# Patient Record
Sex: MALE | Race: WHITE
[De-identification: names, ages, dates, MRNs, and addresses within clinical notes are randomized per-mention and may not be internally consistent; named-entity substitution may affect disease eponyms.]

---

## 2017-07-26 PROBLEM — Z00.00 ENCOUNTER FOR PREVENTIVE HEALTH EXAMINATION: Status: ACTIVE | Noted: 2017-07-26

## 2017-11-27 ENCOUNTER — APPOINTMENT (OUTPATIENT)
Dept: OTOLARYNGOLOGY | Facility: CLINIC | Age: 77
End: 2017-11-27
Payer: MEDICARE

## 2017-11-27 DIAGNOSIS — Z78.9 OTHER SPECIFIED HEALTH STATUS: ICD-10-CM

## 2017-11-27 DIAGNOSIS — J34.89 OTHER SPECIFIED DISORDERS OF NOSE AND NASAL SINUSES: ICD-10-CM

## 2017-11-27 DIAGNOSIS — E78.5 HYPERLIPIDEMIA, UNSPECIFIED: ICD-10-CM

## 2017-11-27 DIAGNOSIS — I10 ESSENTIAL (PRIMARY) HYPERTENSION: ICD-10-CM

## 2017-11-27 DIAGNOSIS — K13.79 OTHER LESIONS OF ORAL MUCOSA: ICD-10-CM

## 2017-11-27 DIAGNOSIS — E11.9 TYPE 2 DIABETES MELLITUS W/OUT COMPLICATIONS: ICD-10-CM

## 2017-11-27 PROCEDURE — 31231 NASAL ENDOSCOPY DX: CPT

## 2017-11-27 PROCEDURE — 99204 OFFICE O/P NEW MOD 45 MIN: CPT | Mod: 25

## 2017-11-27 PROCEDURE — 92550 TYMPANOMETRY & REFLEX THRESH: CPT

## 2017-11-27 PROCEDURE — 99202 OFFICE O/P NEW SF 15 MIN: CPT | Mod: 25

## 2017-11-27 PROCEDURE — 92557 COMPREHENSIVE HEARING TEST: CPT

## 2017-11-27 RX ORDER — ESCITALOPRAM OXALATE 10 MG/1
10 TABLET ORAL
Refills: 0 | Status: ACTIVE | COMMUNITY

## 2017-11-27 RX ORDER — AMLODIPINE BESYLATE 5 MG/1
5 TABLET ORAL
Refills: 0 | Status: ACTIVE | COMMUNITY

## 2017-11-27 RX ORDER — METFORMIN HYDROCHLORIDE 500 MG/1
500 TABLET, FILM COATED, EXTENDED RELEASE ORAL
Refills: 0 | Status: ACTIVE | COMMUNITY

## 2017-11-27 RX ORDER — QUINAPRIL HYDROCHLORIDE 40 MG/1
40 TABLET, FILM COATED ORAL
Refills: 0 | Status: ACTIVE | COMMUNITY

## 2017-11-27 RX ORDER — ATORVASTATIN CALCIUM 40 MG/1
40 TABLET, FILM COATED ORAL
Refills: 0 | Status: ACTIVE | COMMUNITY

## 2017-11-27 RX ORDER — OMEPRAZOLE 20 MG/1
20 CAPSULE, DELAYED RELEASE ORAL
Refills: 0 | Status: ACTIVE | COMMUNITY

## 2017-11-27 RX ORDER — TAMSULOSIN HYDROCHLORIDE 0.4 MG/1
0.4 CAPSULE ORAL
Refills: 0 | Status: ACTIVE | COMMUNITY

## 2017-11-27 RX ORDER — FLUTICASONE PROPIONATE 50 UG/1
50 SPRAY, METERED NASAL DAILY
Qty: 2 | Refills: 3 | Status: ACTIVE | COMMUNITY
Start: 2017-11-27 | End: 1900-01-01

## 2017-11-27 RX ORDER — FINASTERIDE 5 MG/1
5 TABLET, FILM COATED ORAL
Refills: 0 | Status: ACTIVE | COMMUNITY

## 2018-01-17 ENCOUNTER — APPOINTMENT (OUTPATIENT)
Dept: OTOLARYNGOLOGY | Facility: CLINIC | Age: 78
End: 2018-01-17
Payer: MEDICARE

## 2018-01-17 DIAGNOSIS — J34.2 DEVIATED NASAL SEPTUM: ICD-10-CM

## 2018-01-17 PROCEDURE — 99213 OFFICE O/P EST LOW 20 MIN: CPT | Mod: 25

## 2018-01-17 PROCEDURE — 31231 NASAL ENDOSCOPY DX: CPT | Mod: 52

## 2018-03-13 ENCOUNTER — OTHER (OUTPATIENT)
Age: 78
End: 2018-03-13

## 2018-03-13 RX ORDER — DIAZEPAM 10 MG/1
10 TABLET ORAL
Qty: 2 | Refills: 0 | Status: ACTIVE | COMMUNITY
Start: 2018-03-13 | End: 1900-01-01

## 2018-03-22 ENCOUNTER — APPOINTMENT (OUTPATIENT)
Dept: OTOLARYNGOLOGY | Facility: CLINIC | Age: 78
End: 2018-03-22

## 2018-07-18 ENCOUNTER — OUTPATIENT (OUTPATIENT)
Dept: OUTPATIENT SERVICES | Facility: HOSPITAL | Age: 78
LOS: 1 days | Discharge: HOME | End: 2018-07-18

## 2018-07-18 DIAGNOSIS — R53.83 OTHER FATIGUE: ICD-10-CM

## 2018-07-18 DIAGNOSIS — N40.0 BENIGN PROSTATIC HYPERPLASIA WITHOUT LOWER URINARY TRACT SYMPTOMS: ICD-10-CM

## 2018-07-18 DIAGNOSIS — Z00.8 ENCOUNTER FOR OTHER GENERAL EXAMINATION: ICD-10-CM

## 2018-07-18 DIAGNOSIS — E78.5 HYPERLIPIDEMIA, UNSPECIFIED: ICD-10-CM

## 2018-07-18 DIAGNOSIS — I10 ESSENTIAL (PRIMARY) HYPERTENSION: ICD-10-CM

## 2018-07-18 DIAGNOSIS — E55.9 VITAMIN D DEFICIENCY, UNSPECIFIED: ICD-10-CM

## 2018-07-18 DIAGNOSIS — R42 DIZZINESS AND GIDDINESS: ICD-10-CM

## 2019-01-24 ENCOUNTER — OTHER (OUTPATIENT)
Age: 79
End: 2019-01-24

## 2019-01-24 RX ORDER — DIAZEPAM 10 MG/1
10 TABLET ORAL
Qty: 2 | Refills: 0 | Status: ACTIVE | COMMUNITY
Start: 2019-01-24 | End: 1900-01-01

## 2019-01-31 ENCOUNTER — APPOINTMENT (OUTPATIENT)
Dept: OTOLARYNGOLOGY | Facility: CLINIC | Age: 79
End: 2019-01-31
Payer: MEDICARE

## 2019-01-31 DIAGNOSIS — J34.3 HYPERTROPHY OF NASAL TURBINATES: ICD-10-CM

## 2019-01-31 PROCEDURE — 30930 THER FX NASAL INF TURBINATE: CPT

## 2019-01-31 PROCEDURE — 30802 ABLATE INF TURBINATE SUBMUC: CPT | Mod: 59

## 2019-05-06 ENCOUNTER — OUTPATIENT (OUTPATIENT)
Dept: OUTPATIENT SERVICES | Facility: HOSPITAL | Age: 79
LOS: 1 days | Discharge: HOME | End: 2019-05-06
Payer: MEDICARE

## 2019-05-06 DIAGNOSIS — K80.80 OTHER CHOLELITHIASIS WITHOUT OBSTRUCTION: ICD-10-CM

## 2019-05-06 PROCEDURE — 76705 ECHO EXAM OF ABDOMEN: CPT | Mod: 26

## 2019-05-06 PROCEDURE — 76775 US EXAM ABDO BACK WALL LIM: CPT | Mod: 26

## 2021-11-05 ENCOUNTER — OUTPATIENT (OUTPATIENT)
Dept: OUTPATIENT SERVICES | Facility: HOSPITAL | Age: 81
LOS: 1 days | Discharge: HOME | End: 2021-11-05
Payer: COMMERCIAL

## 2021-11-05 DIAGNOSIS — M54.50 LOW BACK PAIN, UNSPECIFIED: ICD-10-CM

## 2021-11-05 PROCEDURE — 72110 X-RAY EXAM L-2 SPINE 4/>VWS: CPT | Mod: 26

## 2021-12-13 ENCOUNTER — OUTPATIENT (OUTPATIENT)
Dept: OUTPATIENT SERVICES | Facility: HOSPITAL | Age: 81
LOS: 1 days | Discharge: HOME | End: 2021-12-13
Payer: COMMERCIAL

## 2021-12-13 DIAGNOSIS — M79.646 PAIN IN UNSPECIFIED FINGER(S): ICD-10-CM

## 2021-12-13 PROCEDURE — 73130 X-RAY EXAM OF HAND: CPT | Mod: 26,RT

## 2022-05-10 ENCOUNTER — OUTPATIENT (OUTPATIENT)
Dept: OUTPATIENT SERVICES | Facility: HOSPITAL | Age: 82
LOS: 1 days | Discharge: HOME | End: 2022-05-10
Payer: COMMERCIAL

## 2022-05-10 DIAGNOSIS — R10.2 PELVIC AND PERINEAL PAIN: ICD-10-CM

## 2022-05-10 PROCEDURE — 76856 US EXAM PELVIC COMPLETE: CPT | Mod: 26

## 2022-12-29 ENCOUNTER — INPATIENT (INPATIENT)
Facility: HOSPITAL | Age: 82
LOS: 0 days | Discharge: HOME | End: 2022-12-30
Attending: INTERNAL MEDICINE | Admitting: INTERNAL MEDICINE
Payer: MEDICARE

## 2022-12-29 VITALS
HEART RATE: 69 BPM | SYSTOLIC BLOOD PRESSURE: 132 MMHG | TEMPERATURE: 96 F | OXYGEN SATURATION: 98 % | DIASTOLIC BLOOD PRESSURE: 71 MMHG | RESPIRATION RATE: 20 BRPM

## 2022-12-29 LAB
ALBUMIN SERPL ELPH-MCNC: 4 G/DL — SIGNIFICANT CHANGE UP (ref 3.5–5.2)
ALP SERPL-CCNC: 63 U/L — SIGNIFICANT CHANGE UP (ref 30–115)
ALT FLD-CCNC: 15 U/L — SIGNIFICANT CHANGE UP (ref 0–41)
ANION GAP SERPL CALC-SCNC: 14 MMOL/L — SIGNIFICANT CHANGE UP (ref 7–14)
AST SERPL-CCNC: 30 U/L — SIGNIFICANT CHANGE UP (ref 0–41)
BASOPHILS # BLD AUTO: 0.05 K/UL — SIGNIFICANT CHANGE UP (ref 0–0.2)
BASOPHILS NFR BLD AUTO: 0.4 % — SIGNIFICANT CHANGE UP (ref 0–1)
BILIRUB SERPL-MCNC: 0.5 MG/DL — SIGNIFICANT CHANGE UP (ref 0.2–1.2)
BUN SERPL-MCNC: 30 MG/DL — HIGH (ref 10–20)
CALCIUM SERPL-MCNC: 8.9 MG/DL — SIGNIFICANT CHANGE UP (ref 8.4–10.5)
CHLORIDE SERPL-SCNC: 97 MMOL/L — LOW (ref 98–110)
CO2 SERPL-SCNC: 28 MMOL/L — SIGNIFICANT CHANGE UP (ref 17–32)
CREAT SERPL-MCNC: 1.2 MG/DL — SIGNIFICANT CHANGE UP (ref 0.7–1.5)
EGFR: 60 ML/MIN/1.73M2 — SIGNIFICANT CHANGE UP
EOSINOPHIL # BLD AUTO: 0.09 K/UL — SIGNIFICANT CHANGE UP (ref 0–0.7)
EOSINOPHIL NFR BLD AUTO: 0.7 % — SIGNIFICANT CHANGE UP (ref 0–8)
GLUCOSE SERPL-MCNC: 73 MG/DL — SIGNIFICANT CHANGE UP (ref 70–99)
HCT VFR BLD CALC: 38.4 % — LOW (ref 42–52)
HGB BLD-MCNC: 12.7 G/DL — LOW (ref 14–18)
IMM GRANULOCYTES NFR BLD AUTO: 0.6 % — HIGH (ref 0.1–0.3)
LYMPHOCYTES # BLD AUTO: 1.61 K/UL — SIGNIFICANT CHANGE UP (ref 1.2–3.4)
LYMPHOCYTES # BLD AUTO: 13.1 % — LOW (ref 20.5–51.1)
MAGNESIUM SERPL-MCNC: 1.7 MG/DL — LOW (ref 1.8–2.4)
MCHC RBC-ENTMCNC: 28.2 PG — SIGNIFICANT CHANGE UP (ref 27–31)
MCHC RBC-ENTMCNC: 33.1 G/DL — SIGNIFICANT CHANGE UP (ref 32–37)
MCV RBC AUTO: 85.3 FL — SIGNIFICANT CHANGE UP (ref 80–94)
MONOCYTES # BLD AUTO: 0.73 K/UL — HIGH (ref 0.1–0.6)
MONOCYTES NFR BLD AUTO: 5.9 % — SIGNIFICANT CHANGE UP (ref 1.7–9.3)
NEUTROPHILS # BLD AUTO: 9.78 K/UL — HIGH (ref 1.4–6.5)
NEUTROPHILS NFR BLD AUTO: 79.3 % — HIGH (ref 42.2–75.2)
NRBC # BLD: 0 /100 WBCS — SIGNIFICANT CHANGE UP (ref 0–0)
PLATELET # BLD AUTO: 302 K/UL — SIGNIFICANT CHANGE UP (ref 130–400)
POTASSIUM SERPL-MCNC: 5.2 MMOL/L — HIGH (ref 3.5–5)
POTASSIUM SERPL-SCNC: 5.2 MMOL/L — HIGH (ref 3.5–5)
PROT SERPL-MCNC: 6.5 G/DL — SIGNIFICANT CHANGE UP (ref 6–8)
RBC # BLD: 4.5 M/UL — LOW (ref 4.7–6.1)
RBC # FLD: 12.8 % — SIGNIFICANT CHANGE UP (ref 11.5–14.5)
SARS-COV-2 RNA SPEC QL NAA+PROBE: SIGNIFICANT CHANGE UP
SODIUM SERPL-SCNC: 139 MMOL/L — SIGNIFICANT CHANGE UP (ref 135–146)
TROPONIN T SERPL-MCNC: <0.01 NG/ML — SIGNIFICANT CHANGE UP
TROPONIN T SERPL-MCNC: <0.01 NG/ML — SIGNIFICANT CHANGE UP
WBC # BLD: 12.33 K/UL — HIGH (ref 4.8–10.8)
WBC # FLD AUTO: 12.33 K/UL — HIGH (ref 4.8–10.8)

## 2022-12-29 PROCEDURE — 93010 ELECTROCARDIOGRAM REPORT: CPT

## 2022-12-29 PROCEDURE — 99221 1ST HOSP IP/OBS SF/LOW 40: CPT | Mod: GC

## 2022-12-29 PROCEDURE — 99222 1ST HOSP IP/OBS MODERATE 55: CPT

## 2022-12-29 PROCEDURE — 93010 ELECTROCARDIOGRAM REPORT: CPT | Mod: 77

## 2022-12-29 PROCEDURE — 71045 X-RAY EXAM CHEST 1 VIEW: CPT | Mod: 26

## 2022-12-29 PROCEDURE — 99236 HOSP IP/OBS SAME DATE HI 85: CPT

## 2022-12-29 RX ORDER — MAGNESIUM OXIDE 400 MG ORAL TABLET 241.3 MG
400 TABLET ORAL ONCE
Refills: 0 | Status: COMPLETED | OUTPATIENT
Start: 2022-12-29 | End: 2022-12-29

## 2022-12-29 RX ORDER — SODIUM CHLORIDE 9 MG/ML
1000 INJECTION INTRAMUSCULAR; INTRAVENOUS; SUBCUTANEOUS ONCE
Refills: 0 | Status: COMPLETED | OUTPATIENT
Start: 2022-12-29 | End: 2022-12-29

## 2022-12-29 RX ADMIN — MAGNESIUM OXIDE 400 MG ORAL TABLET 400 MILLIGRAM(S): 241.3 TABLET ORAL at 21:46

## 2022-12-29 RX ADMIN — SODIUM CHLORIDE 1000 MILLILITER(S): 9 INJECTION INTRAMUSCULAR; INTRAVENOUS; SUBCUTANEOUS at 16:59

## 2022-12-29 NOTE — ED ADULT TRIAGE NOTE - GLASGOW COMA SCALE: SCORE, MLM
Patient has done well thoughout the day. Spouse at bedside through the day.
Patient denies additional blood with stools. Has been up ambulating throughout
the day. Denies further needs at this time. Will report off to night shift. 15

## 2022-12-29 NOTE — H&P ADULT - NSHPPHYSICALEXAM_GEN_ALL_CORE
VITALS  T(C): 36.9 (12-29-22 @ 19:30), Max: 36.9 (12-29-22 @ 19:30)  HR: 87 (12-29-22 @ 19:30) (69 - 87)  BP: 144/75 (12-29-22 @ 19:30) (132/71 - 144/75)  RR: 18 (12-29-22 @ 19:30) (18 - 20)  SpO2: 98% (12-29-22 @ 19:30) (98% - 98%)    PHYSICAL EXAM  GENERAL: NAD, well-developed  NEURO: AO x3, PERRLA, EOMI, motor strength intact in 4/4 extremities, sensation intact  HEAD:  Atraumatic, Normocephalic  EYES: conjunctiva and sclera clear  NECK: Supple, No JVD, no lymphadenopathy, no thyromegaly  CHEST/LUNG: Clear to auscultation bilaterally; No wheezes, rales or rhonchi  HEART: Regular rate and rhythm; No murmurs, rubs, or gallops  ABDOMEN: Soft, Nontender, Nondistended; Bowel sounds present, no masses.  EXTREMITIES:  2+ Peripheral Pulses, No clubbing, cyanosis, or edema  SKIN: Warm, dry, intact, no rashes or lesions  PSYCH: affect appropriate

## 2022-12-29 NOTE — H&P ADULT - NSICDXPASTMEDICALHX_GEN_ALL_CORE_FT
PAST MEDICAL HISTORY:  BPH (benign prostatic hyperplasia)     DM (diabetes mellitus)     Dyslipidemia     Hypertension

## 2022-12-29 NOTE — ED CDU PROVIDER INITIAL DAY NOTE - PROGRESS NOTE DETAILS
2nd Ekg with dynamic changes compared to initial; stemi code called - pt seen by cardio fellow; no emergent invervention required; admit to tele; pt currenlty assymptomatic;

## 2022-12-29 NOTE — ED PROVIDER NOTE - COVID-19  TEST TYPE
DR. Hernandez called and updated, pt agitated, sitting up in bed and vomited.  Pt given lorazepam which helped the agitation and pt calmed down.  Propofol and precedex infusions continue.  Pt's sister at bedside. Will stop tube feeding for the night and reassess in am. Pt also have increased secretions from ETT since 1400, thick yellow color.  Will obtain sputum sample per orders. Report given to Brian HAYWOOD at bedside and Dr. Hernandez in to see pt.  Pt following some simple commands. Will continue to monitor.    MOLECULAR PCR

## 2022-12-29 NOTE — CHART NOTE - NSCHARTNOTEFT_GEN_A_CORE
Pt was seen and examined at bedside.     82 year old M with hx of htn, hld presenting to er with episode of near syncope. Pt was driving in car earlier today when had episode of lightheadedness/near syncope x few minutes. Pt was able to pull car over and call ems. Patient denies any chest pain.     Trop negx2    EKG: NSR with no ischemic ST changes.     Case was discussed with attending.     Code STEMI was cancelled.

## 2022-12-29 NOTE — ED PROVIDER NOTE - NS ED ROS FT
Constitutional: no fever, chills, no recent weight loss, change in appetite or malaise  Eyes: no redness/discharge/pain/vision changes  ENT: no rhinorrhea/ear pain/sore throat  Cardiac: No chest pain, SOB or edema.  Respiratory: No cough or respiratory distress  GI: No nausea, vomiting, diarrhea or abdominal pain.  : No dysuria, frequency, urgency or hematuria  MS: no pain to back or extremities, no loss of ROM, no weakness  Neuro: + lightheadedness, near syncope. No headache or weakness. No LOC.  Skin: No skin rash.  Endocrine: No history of thyroid disease or diabetes.  Except as documented in the HPI, all other systems are negative.

## 2022-12-29 NOTE — ED PROVIDER NOTE - OBJECTIVE STATEMENT
82 year old M with hx of htn, hld presenting to er with episode of near syncope. Pt sts was driving in car earlier today when had episode of lightheadedness/near syncope x few minutes. Pt was able to pull car over and call ems. Pt sts had similar episode at home yesterday lasting few min. Currently denies any symptoms in ed. Sts has been eating/drinking well at home. + recent uri symptoms and currently on abx (does not remember name). Otherwise denies any assoc headache, dizziness, loc, chest pain, sob, abd pain, nausea, vomiting, diarrhea, visual changes, urinary symptoms, speech changes facial droop ,inability to bare weight or ambulate.

## 2022-12-29 NOTE — ED PROVIDER NOTE - PHYSICAL EXAMINATION
CONSTITUTIONAL: Well-appearing; well-nourished; in no apparent distress.   EYES: PERRL; EOM intact.   NECK: Supple; non-tender; no cervical lymphadenopathy.   CARDIOVASCULAR: Normal S1, S2; no murmurs, rubs, or gallops. Equal radial pulses  RESPIRATORY: Normal chest excursion with respiration; breath sounds clear and equal bilaterally; no wheezes, rhonchi, or rales.  GI/: Normal bowel sounds; non-distended; non-tender; no palpable organomegaly.   MS: No evidence of trauma or deformity. Normal ROM in all four extremities; non-tender to palpation; distal pulses are normal.   SKIN: Normal for age and race; warm; dry; good turgor; no apparent lesions or exudate.   Extrem: no peripheral edema. No calf ttp  NEURO/PSYCH: A & O x 4; grossly unremarkable. mood and manner are appropriate. no focal deficits. no facial droop. No tongue deviation. Cerebellar intact. normal gait. Sensation intact

## 2022-12-29 NOTE — CONSULT NOTE ADULT - ASSESSMENT
82 year old M with hx of htn, hld presenting to er with episode of near syncope. Pt was driving in car earlier today when had episode of lightheadedness/near syncope x few minutes not associated with palpitation or chest pain. Pt was able to pull car over and call EMS. Patient denies similar prior events. Pt follow with outpt cardiology at [facility] for HTN.      # Near Syncope   # HTN / HLD  - asymptomatic at encounter  - no events on tele  - trop neg x2  - ekg: NSR  - check echo   - restart home meds   - c/w tele monitoring --> if no events then outpt cardiology f/u for event monitor    82 year old M with hx of htn, hld presenting to er with episode of near syncope. Pt was driving in car earlier today when had episode of lightheadedness/near syncope x few minutes not associated with palpitation or chest pain. Pt was able to pull car over and call EMS. Patient denies similar prior events. Pt follow with outpt cardiology at [facility] for HTN.      # Near Syncope   # HTN / HLD  - asymptomatic at encounter  - no events on tele  - trop neg x2  - ekg: NSR  - check echo  - check orthostatics    - restart home meds   - c/w tele monitoring --> if no events then outpt cardiology f/u for event monitor    82 year old M with hx of htn, hld presenting to er with episode of near syncope. Pt was driving in car earlier today when had episode of lightheadedness/near syncope x few minutes not associated with palpitation or chest pain. Pt was able to pull car over and call EMS. Patient denies similar prior events. Pt follow with outpt cardiology at [facility] for HTN.      # Near Syncope   # HTN / HLD    - asymptomatic at encounter  - no events on tele  - trop neg x2  - ekg: NSR    - check echo  - check orthostatics    - restart home meds   - c/w tele monitoring --> if no events then outpt cardiology f/u for event monitor

## 2022-12-29 NOTE — ED CDU PROVIDER DISPOSITION NOTE - CLINICAL COURSE
82-year-old male history of hypertension dyslipidemia prediabetes former smoker presenting for 2 episodes of near syncope.  Both episodes while at rest, felt dizzy/lightheaded and presyncopal.  No fall/trauma.  No other acute complaints.  No chest pain, shortness of breath, abdominal pain, nausea vomiting diarrhea or urinary symptoms.  Currently feeling well.  Well appearing, NAD, non toxic. NCAT PERRLA EOMI neck supple non tender normal wob cta bl rrr abdomen s nt nd no rebound no guarding WWPx4 neuro non focal.  Labs EKG imaging reviewed.  2nd EKG with ischemic changes. sp cardiology eval. will admit for further eval

## 2022-12-29 NOTE — H&P ADULT - HISTORY OF PRESENT ILLNESS
82M w/ h/o HTN, DLD, DM, and BPH p/w lightheadedness a/w near-syncope. Episode occurred earlier today while driving and last a few minutes. Subsequently pulled over and called EMS. Reports similar episode yesterday that also lasted a few minutes, but otherwise no similar prior h/o. Recently diagnosed w/ URI and started on cefdinir. No decreased PO intake reported. No reported fevers, chills, CP, palpitations, SOB, abdominal pain, n/v/d, dysuria, or LE swelling.     In ED, pt HD stable on vitals. No acute ischemic changes on initial EKG. Tn negative x2. Initially admitted to OBS unit. However, repeat EKG demonstrated findings for acute ischemic changes, so Code STEMI activated. Per cardiology, no acute intervention offered at this time. Admitted to telemetry for ACS r/o.

## 2022-12-29 NOTE — ED ADULT NURSE NOTE - NSIMPLEMENTINTERV_GEN_ALL_ED
Implemented All Universal Safety Interventions:  [place] to call system. Call bell, personal items and telephone within reach. Instruct patient to call for assistance. Room bathroom lighting operational. Non-slip footwear when patient is off stretcher. Physically safe environment: no spills, clutter or unnecessary equipment. Stretcher in lowest position, wheels locked, appropriate side rails in place.

## 2022-12-29 NOTE — ED PROVIDER NOTE - CLINICAL SUMMARY MEDICAL DECISION MAKING FREE TEXT BOX
82-year-old male history of hypertension dyslipidemia prediabetes former smoker presenting for 2 episodes of near syncope.  Both episodes while at rest, felt dizzy/lightheaded and presyncopal.  No fall/trauma.  No other acute complaints.  No chest pain, shortness of breath, abdominal pain, nausea vomiting diarrhea or urinary symptoms.  Currently feeling well.  Well appearing, NAD, non toxic. NCAT PERRLA EOMI neck supple non tender normal wob cta bl rrr abdomen s nt nd no rebound no guarding WWPx4 neuro non focal.  Labs EKG imaging reviewed.  Will OBS for further evaluation.

## 2022-12-29 NOTE — CONSULT NOTE ADULT - SUBJECTIVE AND OBJECTIVE BOX
Outpt cardiologist: [facility]     HPI:  82 year old M with hx of htn, hld presenting to er with episode of near syncope. Pt was driving in car earlier today when had episode of lightheadedness/near syncope x few minutes not associated with palpiation or chest pain. Pt was able to pull car over and call EMS. Patient denies similar prior events. Pt follow with outpt cardiology at [facility] for HTN.        PAST MEDICAL & SURGICAL HISTORY      FAMILY HISTORY:  FAMILY HISTORY:  No pertinent family history in first degree relatives        SOCIAL HISTORY:  Social History: former smoker       ALLERGIES:  No Known Allergies      MEDICATIONS:    PRN:      HOME MEDICATIONS:  Home Medications:      VITALS:   T(F): 98.4 (- @ 19:30), Max: 98.4 ( @ 19:30)  HR: 87 ( @ 19:30) (69 - 87)  BP: 144/75 ( @ 19:30) (132/71 - 144/75)  BP(mean): --  RR: 18 ( @ 19:30) (18 - 20)  SpO2: 98% ( @ 19:30) (98% - 98%)    I&O's Summary      REVIEW OF SYSTEMS:  CONSTITUTIONAL: No weakness, fevers or chills (+) lightheadedness   HEENT: No visual changes, neck/ear pain  RESPIRATORY: No cough, sob  CARDIOVASCULAR: See HPI  GASTROINTESTINAL: No abdominal pain. No nausea, vomiting, diarrhea   GENITOURINARY: No dysuria, frequency or hematuria  NEUROLOGICAL: No new focal deficits  SKIN: No new rashes    PHYSICAL EXAM:  General: Not in distress.  Non-toxic appearing.   HEENT: EOMI  Cardio: regular, S1, S2, no murmur  Pulm: B/L BS.  No wheezing / crackles / rales  Abdomen: Soft, non-tender, non-distended. Normoactive bowel sounds  Extremities: No edema b/l le  Neuro: A&O x3. No focal deficits    LABS:                        12.7   12.33 )-----------( 302      ( 29 Dec 2022 17:20 )             38.4         139  |  97<L>  |  30<H>  ----------------------------<  73  5.2<H>   |  28  |  1.2    Ca    8.9      29 Dec 2022 17:20  Mg     1.7         TPro  6.5  /  Alb  4.0  /  TBili  0.5  /  DBili  x   /  AST  30  /  ALT  15  /  AlkPhos  63        Troponin T, Serum: <0.01 ng/mL (22 @ 21:36)  Troponin T, Serum: <0.01 ng/mL (22 @ 17:20)    CARDIAC MARKERS ( 29 Dec 2022 21:36 )  x     / <0.01 ng/mL / x     / x     / x      CARDIAC MARKERS ( 29 Dec 2022 17:20 )  x     / <0.01 ng/mL / x     / x     / x            Troponin trend: trop neg x2        COVID-19 PCR: NotDetec (29 Dec 2022 15:45)      RADIOLOGY:  CXR: no acute cardiopul disease       EC Lead ECG:   Ventricular Rate 70 BPM    Atrial Rate 70 BPM    P-R Interval 132 ms    QRS Duration 116 ms    Q-T Interval 416 ms    QTC Calculation(Bazett) 449 ms    P Axis 42 degrees    R Axis 46 degrees    T Axis 48 degrees    Diagnosis Line Normal sinus rhythm  Normal ECG    Confirmed by Eduard Elias ([id number]) on 2022 2:53:47 PM ( @ 14:13)      TELEMETRY EVENTS:   Outpt cardiologist:  [facility]    HPI:  82 year old M with hx of htn, hld presenting to er with episode of near syncope. Pt was driving in car earlier today when had episode of lightheadedness/near syncope x few minutes not associated with palpitations or chest pain. Pt was able to pull car over and call EMS. Patient denies similar prior events. Pt follow with outpt cardiology at [facility] for HTN.          No pertinent family history of premature CAD in first degree relatives    Social History: former smoker       ALLERGIES:  No Known Allergies      MEDICATIONS:      VITALS:   T(F): 98.4 (12-29 @ 19:30), Max: 98.4 (12-29 @ 19:30)  HR: 87 (12-29 @ 19:30) (69 - 87)  BP: 144/75 (12-29 @ 19:30) (132/71 - 144/75)  BP(mean): --  RR: 18 (12-29 @ 19:30) (18 - 20)  SpO2: 98% (12-29 @ 19:30) (98% - 98%)      REVIEW OF SYSTEMS:  CONSTITUTIONAL: No weakness, fevers or chills (+) lightheadedness   HEENT: No visual changes, neck/ear pain  RESPIRATORY: No cough, sob  CARDIOVASCULAR: See HPI  GASTROINTESTINAL: No abdominal pain. No nausea, vomiting, diarrhea   GENITOURINARY: No dysuria, frequency or hematuria  NEUROLOGICAL: No new focal deficits  SKIN: No new rashes    PHYSICAL EXAM:  General: Not in distress.  Non-toxic appearing.   HEENT: EOMI  Cardio: regular, S1, S2, no murmur  Pulm: B/L BS.  No wheezing / crackles / rales  Abdomen: Soft, non-tender, non-distended  Extremities: No edema b/l le  Neuro: A&O x3. No focal deficits    LABS:                        12.7   12.33 )-----------( 302      ( 29 Dec 2022 17:20 )             38.4     12-29    139  |  97<L>  |  30<H>  ----------------------------<  73  5.2<H>   |  28  |  1.2    Ca    8.9      29 Dec 2022 17:20  Mg     1.7     12-29    TPro  6.5  /  Alb  4.0  /  TBili  0.5  /  DBili  x   /  AST  30  /  ALT  15  /  AlkPhos  63  12-29      Troponin T, Serum: <0.01 ng/mL (12-29-22 @ 21:36)  Troponin T, Serum: <0.01 ng/mL (12-29-22 @ 17:20)      COVID-19 PCR: NotDete (29 Dec 2022 15:45)      RADIOLOGY:  CXR: no acute cardiopul disease

## 2022-12-29 NOTE — CONSULT NOTE ADULT - ATTENDING COMMENTS
Cardiologist:  Dr. Pa Jeff at [facility]    Dizziness while driving  Mild dehydration  Recent URI 2 weeks ago  Transient inferior TWI on EKG    Telemetry:  SR, no events    No recurrent dizziness  No other complaints    If TTE is unremarkable and no events on telemetry, he can follow up with his primary cardiologist for further testing.

## 2022-12-29 NOTE — H&P ADULT - ATTENDING COMMENTS
HPI:  82M w/ h/o HTN, DLD, DM, and BPH p/w lightheadedness a/w near-syncope. Episode occurred earlier today while driving and last a few minutes. Subsequently pulled over and called EMS. Reports similar episode yesterday that also lasted a few minutes, but otherwise no similar prior h/o. Recently diagnosed w/ URI and started on cefdinir. No decreased PO intake reported. No reported fevers, chills, CP, palpitations, SOB, abdominal pain, n/v/d, dysuria, or LE swelling.     In ED, pt HD stable on vitals. No acute ischemic changes on initial EKG. Tn negative x2. Initially admitted to OBS unit. However, repeat EKG demonstrated findings for acute ischemic changes, so Code STEMI activated. Per cardiology, no acute intervention offered at this time. Admitted to telemetry for ACS r/o. (29 Dec 2022 23:48)    REVIEW OF SYSTEMS: see cc/HPI   CONSTITUTIONAL: No weakness, fevers or chills  EYES/ENT: No visual changes;  No vertigo or throat pain   NECK: No pain or stiffness  RESPIRATORY: No cough, wheezing, hemoptysis; No shortness of breath  CARDIOVASCULAR: No chest pain or palpitations  GASTROINTESTINAL: No abdominal or epigastric pain. No nausea, vomiting, or hematemesis; No diarrhea or constipation. No melena or hematochezia.  GENITOURINARY: No dysuria, frequency or hematuria  NEUROLOGICAL: No numbness or weakness  SKIN: No itching, rashes    Physical Exam:   General: WN/WD NAD  Neurology: A&Ox3, nonfocal, follows commands  Eyes: PERRLA/ EOMI  ENT/Neck: Neck supple, trachea midline, No JVD  Respiratory: CTA B/L, No wheezing, rales, rhonchi  CV: Normal rate regular rhythm, S1S2, no murmurs, rubs or gallops  Abdominal: Soft, NT, ND +BS,   Extremities: No edema, + peripheral pulses    A/p  Near-syncope / STEMI on EKG ( cancelled by Cardio )  H/o HTN   H/o Dyslipidemia   -admit to tele   -ASA / statin/ ACEI  -c/w Chlorthalidone   -serial Kenzie and EKGs   -2D echo / Lipid panel / A1c   -check orthostatic vital   -Cardiology eval     DM type II   -hold Metformin, may need procedure this admission  -A1c      BPH   -c/w Flomax /Proscar    DVT prophylaxis HPI:  82M w/ h/o HTN, DLD, DM, and BPH p/w lightheadedness a/w near-syncope. Episode occurred earlier today while driving and last a few minutes. Subsequently pulled over and called EMS. Reports similar episode yesterday that also lasted a few minutes, but otherwise no similar prior h/o. Recently diagnosed w/ URI and started on cefdinir. No decreased PO intake reported. No reported fevers, chills, CP, palpitations, SOB, abdominal pain, n/v/d, dysuria, or LE swelling.     In ED, pt HD stable on vitals. No acute ischemic changes on initial EKG. Tn negative x2. Initially admitted to OBS unit. However, repeat EKG demonstrated findings for acute ischemic changes, so Code STEMI activated. Per cardiology, no acute intervention offered at this time. Admitted to telemetry for ACS r/o. (29 Dec 2022 23:48)    REVIEW OF SYSTEMS: see cc/HPI   CONSTITUTIONAL: No weakness, fevers or chills  EYES/ENT: No visual changes;  No vertigo or throat pain   NECK: No pain or stiffness  RESPIRATORY: No cough, wheezing, hemoptysis; No shortness of breath  CARDIOVASCULAR: No chest pain or palpitations  GASTROINTESTINAL: No abdominal or epigastric pain. No nausea, vomiting, or hematemesis; No diarrhea or constipation. No melena or hematochezia.  GENITOURINARY: No dysuria, frequency or hematuria  NEUROLOGICAL: No numbness or weakness  SKIN: No itching, rashes    Physical Exam:   General: WN/WD NAD  Neurology: A&Ox3, nonfocal, follows commands  Eyes: PERRLA/ EOMI  ENT/Neck: Neck supple, trachea midline, No JVD  Respiratory: CTA B/L, No wheezing, rales, rhonchi  CV: Normal rate regular rhythm, S1S2, no murmurs, rubs or gallops  Abdominal: Soft, NT, ND +BS,   Extremities: No edema, + peripheral pulses    A/p  Near-syncope / STEMI on EKG ( cancelled by Cardio )  H/o HTN   H/o Dyslipidemia   -admit to tele   -ASA / statin/ ACEI  -c/w Chlorthalidone   -serial Kenzie and EKGs   -2D echo / Lipid panel / A1c   -check orthostatic vital   -Cardiology eval     DM type II   -hold Metformin, may need procedure this admission  -A1c      BPH   -c/w Flomax /Proscar    DVT prophylaxis    SEEN by ATTENDING 12/29/22 ( note revised 12/30)

## 2022-12-29 NOTE — H&P ADULT - ASSESSMENT
82M w/ h/o HTN, DLD, DM, and BPH p/w lightheadedness a/w near-syncope. Admitted to telemetry for ACS r/o.    #ACS, r/o  #Hypertension  #Dyslipidemia  Initially p/w lightheadedness a/w near syncope. Tn neg x2. Initial EKG showed no acute ischemic changes, but repeat EKG demonstrated findings for acute ischemic changes, so Code STEMI activated. Given lack of chest pain at the time, no acute intervention offered per cardiology.  - c/w atorvastatin 40mg PO QHS  - c/w amlodipine 10mg PO QD  - c/w chlorthalidone 25mg PO QD  - c/w lisinopril 40mg PO QD (therapeutic interchange for quinapril)  - admit to telemetry  - repeat EKG in AM  - repeat Tn in AM (Initial Tn neg x2)  - check lipid panel, A1c, and TSH  - obtain TTE  - cardiology consulted; f/u recs     #Diabetes Mellitus  Managed via metformin 500 bid  - Monitor FS TIDAC and QHS  - c/w moderate-dose ISS   - obtain A1c as above    #BPH  - c/w tamsulosin 0.4mg PO QHS  - c/w finasteride 5mg PO QD    DVT PPX:  GI PPX: none indicated  DIET: DASH/TLC + CC  ACTIVITY: IAT  CODE STATUS: Full Code  DISPOSITION: From Home    PENDING: repeat Tn; TTE; formal cardiology consult 82M w/ h/o HTN, DLD, DM, and BPH p/w lightheadedness a/w near-syncope. Admitted to telemetry for ACS r/o.    #ACS, r/o  #Hypertension  #Dyslipidemia  Initially p/w lightheadedness a/w near syncope. Tn neg x2. Initial EKG showed no acute ischemic changes, but repeat EKG demonstrated findings for acute ischemic changes, so Code STEMI activated. Given lack of chest pain at the time, no acute intervention offered per cardiology.  - c/w atorvastatin 40mg PO QHS  - c/w amlodipine 10mg PO QD  - c/w lisinopril 40mg PO QD (therapeutic interchange for quinapril)  - hold chlorthalidone (non-formulary)  - admit to telemetry  - repeat EKG in AM  - repeat Tn in AM (Initial Tn neg x2)  - check lipid panel, A1c, and TSH  - obtain TTE  - cardiology consulted; f/u recs     #Diabetes Mellitus  Managed via metformin 500 bid  - Monitor FS TIDAC and QHS  - c/w moderate-dose ISS   - obtain A1c as above    #BPH  - c/w tamsulosin 0.4mg PO QHS  - c/w finasteride 5mg PO QD    DVT PPX:  GI PPX: none indicated  DIET: DASH/TLC + CC  ACTIVITY: IAT  CODE STATUS: Full Code  DISPOSITION: From Home    PENDING: repeat Tn; TTE; formal cardiology consult

## 2022-12-29 NOTE — ED ADULT NURSE NOTE - OBJECTIVE STATEMENT
Pt arrived to ED aox4 with near syncopal episode while driving today became lightheaded, able to pull over, episode lasted a few minutes. ALso states similar episode yesterday. IV inserted, pending lab and xray results. will continue to monitor.

## 2022-12-30 ENCOUNTER — TRANSCRIPTION ENCOUNTER (OUTPATIENT)
Age: 82
End: 2022-12-30

## 2022-12-30 VITALS
RESPIRATION RATE: 18 BRPM | OXYGEN SATURATION: 97 % | HEART RATE: 65 BPM | TEMPERATURE: 97 F | SYSTOLIC BLOOD PRESSURE: 130 MMHG | DIASTOLIC BLOOD PRESSURE: 62 MMHG

## 2022-12-30 LAB
A1C WITH ESTIMATED AVERAGE GLUCOSE RESULT: 6 % — HIGH (ref 4–5.6)
ALBUMIN SERPL ELPH-MCNC: 3.9 G/DL — SIGNIFICANT CHANGE UP (ref 3.5–5.2)
ALP SERPL-CCNC: 67 U/L — SIGNIFICANT CHANGE UP (ref 30–115)
ALT FLD-CCNC: 14 U/L — SIGNIFICANT CHANGE UP (ref 0–41)
ANION GAP SERPL CALC-SCNC: 13 MMOL/L — SIGNIFICANT CHANGE UP (ref 7–14)
AST SERPL-CCNC: 13 U/L — SIGNIFICANT CHANGE UP (ref 0–41)
BASOPHILS # BLD AUTO: 0.06 K/UL — SIGNIFICANT CHANGE UP (ref 0–0.2)
BASOPHILS NFR BLD AUTO: 0.6 % — SIGNIFICANT CHANGE UP (ref 0–1)
BILIRUB SERPL-MCNC: 0.5 MG/DL — SIGNIFICANT CHANGE UP (ref 0.2–1.2)
BUN SERPL-MCNC: 25 MG/DL — HIGH (ref 10–20)
CALCIUM SERPL-MCNC: 8.9 MG/DL — SIGNIFICANT CHANGE UP (ref 8.4–10.5)
CHLORIDE SERPL-SCNC: 99 MMOL/L — SIGNIFICANT CHANGE UP (ref 98–110)
CHOLEST SERPL-MCNC: 129 MG/DL — SIGNIFICANT CHANGE UP
CO2 SERPL-SCNC: 26 MMOL/L — SIGNIFICANT CHANGE UP (ref 17–32)
CREAT SERPL-MCNC: 1.2 MG/DL — SIGNIFICANT CHANGE UP (ref 0.7–1.5)
EGFR: 60 ML/MIN/1.73M2 — SIGNIFICANT CHANGE UP
EOSINOPHIL # BLD AUTO: 0.12 K/UL — SIGNIFICANT CHANGE UP (ref 0–0.7)
EOSINOPHIL NFR BLD AUTO: 1.3 % — SIGNIFICANT CHANGE UP (ref 0–8)
ESTIMATED AVERAGE GLUCOSE: 126 MG/DL — HIGH (ref 68–114)
GLUCOSE BLDC GLUCOMTR-MCNC: 113 MG/DL — HIGH (ref 70–99)
GLUCOSE BLDC GLUCOMTR-MCNC: 82 MG/DL — SIGNIFICANT CHANGE UP (ref 70–99)
GLUCOSE SERPL-MCNC: 191 MG/DL — HIGH (ref 70–99)
HCT VFR BLD CALC: 39.9 % — LOW (ref 42–52)
HDLC SERPL-MCNC: 27 MG/DL — LOW
HGB BLD-MCNC: 13.2 G/DL — LOW (ref 14–18)
IMM GRANULOCYTES NFR BLD AUTO: 0.3 % — SIGNIFICANT CHANGE UP (ref 0.1–0.3)
LIPID PNL WITH DIRECT LDL SERPL: 64 MG/DL — SIGNIFICANT CHANGE UP
LYMPHOCYTES # BLD AUTO: 1.61 K/UL — SIGNIFICANT CHANGE UP (ref 1.2–3.4)
LYMPHOCYTES # BLD AUTO: 16.9 % — LOW (ref 20.5–51.1)
MAGNESIUM SERPL-MCNC: 1.8 MG/DL — SIGNIFICANT CHANGE UP (ref 1.8–2.4)
MCHC RBC-ENTMCNC: 28.4 PG — SIGNIFICANT CHANGE UP (ref 27–31)
MCHC RBC-ENTMCNC: 33.1 G/DL — SIGNIFICANT CHANGE UP (ref 32–37)
MCV RBC AUTO: 85.8 FL — SIGNIFICANT CHANGE UP (ref 80–94)
MONOCYTES # BLD AUTO: 0.51 K/UL — SIGNIFICANT CHANGE UP (ref 0.1–0.6)
MONOCYTES NFR BLD AUTO: 5.3 % — SIGNIFICANT CHANGE UP (ref 1.7–9.3)
NEUTROPHILS # BLD AUTO: 7.21 K/UL — HIGH (ref 1.4–6.5)
NEUTROPHILS NFR BLD AUTO: 75.6 % — HIGH (ref 42.2–75.2)
NON HDL CHOLESTEROL: 102 MG/DL — SIGNIFICANT CHANGE UP
NRBC # BLD: 0 /100 WBCS — SIGNIFICANT CHANGE UP (ref 0–0)
PHOSPHATE SERPL-MCNC: 2.8 MG/DL — SIGNIFICANT CHANGE UP (ref 2.1–4.9)
PLATELET # BLD AUTO: 282 K/UL — SIGNIFICANT CHANGE UP (ref 130–400)
POTASSIUM SERPL-MCNC: 3.7 MMOL/L — SIGNIFICANT CHANGE UP (ref 3.5–5)
POTASSIUM SERPL-SCNC: 3.7 MMOL/L — SIGNIFICANT CHANGE UP (ref 3.5–5)
PROT SERPL-MCNC: 6 G/DL — SIGNIFICANT CHANGE UP (ref 6–8)
RBC # BLD: 4.65 M/UL — LOW (ref 4.7–6.1)
RBC # FLD: 13 % — SIGNIFICANT CHANGE UP (ref 11.5–14.5)
SODIUM SERPL-SCNC: 138 MMOL/L — SIGNIFICANT CHANGE UP (ref 135–146)
T4 FREE+ TSH PNL SERPL: 3.08 UIU/ML — SIGNIFICANT CHANGE UP (ref 0.27–4.2)
TRIGL SERPL-MCNC: 191 MG/DL — HIGH
TROPONIN T SERPL-MCNC: <0.01 NG/ML — SIGNIFICANT CHANGE UP
WBC # BLD: 9.54 K/UL — SIGNIFICANT CHANGE UP (ref 4.8–10.8)
WBC # FLD AUTO: 9.54 K/UL — SIGNIFICANT CHANGE UP (ref 4.8–10.8)

## 2022-12-30 PROCEDURE — 93010 ELECTROCARDIOGRAM REPORT: CPT

## 2022-12-30 PROCEDURE — 93306 TTE W/DOPPLER COMPLETE: CPT | Mod: 26

## 2022-12-30 RX ORDER — TAMSULOSIN HYDROCHLORIDE 0.4 MG/1
0.4 CAPSULE ORAL AT BEDTIME
Refills: 0 | Status: DISCONTINUED | OUTPATIENT
Start: 2022-12-30 | End: 2022-12-30

## 2022-12-30 RX ORDER — QUINAPRIL HYDROCHLORIDE 40 MG/1
1 TABLET, FILM COATED ORAL
Qty: 0 | Refills: 0 | DISCHARGE

## 2022-12-30 RX ORDER — ENOXAPARIN SODIUM 100 MG/ML
40 INJECTION SUBCUTANEOUS EVERY 24 HOURS
Refills: 0 | Status: DISCONTINUED | OUTPATIENT
Start: 2022-12-30 | End: 2022-12-30

## 2022-12-30 RX ORDER — CHLORTHALIDONE 50 MG
0.5 TABLET ORAL
Qty: 15 | Refills: 0
Start: 2022-12-30 | End: 2023-01-28

## 2022-12-30 RX ORDER — ASPIRIN/CALCIUM CARB/MAGNESIUM 324 MG
324 TABLET ORAL ONCE
Refills: 0 | Status: COMPLETED | OUTPATIENT
Start: 2022-12-30 | End: 2022-12-30

## 2022-12-30 RX ORDER — CITALOPRAM 10 MG/1
20 TABLET, FILM COATED ORAL DAILY
Refills: 0 | Status: DISCONTINUED | OUTPATIENT
Start: 2022-12-30 | End: 2022-12-30

## 2022-12-30 RX ORDER — LISINOPRIL 2.5 MG/1
40 TABLET ORAL DAILY
Refills: 0 | Status: DISCONTINUED | OUTPATIENT
Start: 2022-12-30 | End: 2022-12-30

## 2022-12-30 RX ORDER — CITALOPRAM 10 MG/1
1 TABLET, FILM COATED ORAL
Qty: 0 | Refills: 0 | DISCHARGE

## 2022-12-30 RX ORDER — CHLORTHALIDONE 50 MG
1 TABLET ORAL
Qty: 0 | Refills: 0 | DISCHARGE

## 2022-12-30 RX ORDER — ATORVASTATIN CALCIUM 80 MG/1
80 TABLET, FILM COATED ORAL ONCE
Refills: 0 | Status: COMPLETED | OUTPATIENT
Start: 2022-12-30 | End: 2022-12-30

## 2022-12-30 RX ORDER — ATORVASTATIN CALCIUM 80 MG/1
1 TABLET, FILM COATED ORAL
Qty: 0 | Refills: 0 | DISCHARGE

## 2022-12-30 RX ORDER — FINASTERIDE 5 MG/1
5 TABLET, FILM COATED ORAL DAILY
Refills: 0 | Status: DISCONTINUED | OUTPATIENT
Start: 2022-12-30 | End: 2022-12-30

## 2022-12-30 RX ORDER — AMLODIPINE BESYLATE 2.5 MG/1
1 TABLET ORAL
Qty: 0 | Refills: 0 | DISCHARGE

## 2022-12-30 RX ORDER — TAMSULOSIN HYDROCHLORIDE 0.4 MG/1
1 CAPSULE ORAL
Qty: 0 | Refills: 0 | DISCHARGE

## 2022-12-30 RX ORDER — METFORMIN HYDROCHLORIDE 850 MG/1
1 TABLET ORAL
Qty: 0 | Refills: 0 | DISCHARGE

## 2022-12-30 RX ORDER — FINASTERIDE 5 MG/1
1 TABLET, FILM COATED ORAL
Qty: 0 | Refills: 0 | DISCHARGE

## 2022-12-30 RX ORDER — ATORVASTATIN CALCIUM 80 MG/1
40 TABLET, FILM COATED ORAL AT BEDTIME
Refills: 0 | Status: DISCONTINUED | OUTPATIENT
Start: 2022-12-30 | End: 2022-12-30

## 2022-12-30 RX ORDER — QUINAPRIL HYDROCHLORIDE 40 MG/1
0 TABLET, FILM COATED ORAL
Qty: 0 | Refills: 0 | DISCHARGE

## 2022-12-30 RX ORDER — AMLODIPINE BESYLATE 2.5 MG/1
10 TABLET ORAL DAILY
Refills: 0 | Status: DISCONTINUED | OUTPATIENT
Start: 2022-12-30 | End: 2022-12-30

## 2022-12-30 RX ADMIN — AMLODIPINE BESYLATE 10 MILLIGRAM(S): 2.5 TABLET ORAL at 05:25

## 2022-12-30 RX ADMIN — ENOXAPARIN SODIUM 40 MILLIGRAM(S): 100 INJECTION SUBCUTANEOUS at 05:25

## 2022-12-30 RX ADMIN — CITALOPRAM 20 MILLIGRAM(S): 10 TABLET, FILM COATED ORAL at 13:02

## 2022-12-30 RX ADMIN — Medication 324 MILLIGRAM(S): at 00:26

## 2022-12-30 RX ADMIN — LISINOPRIL 40 MILLIGRAM(S): 2.5 TABLET ORAL at 05:25

## 2022-12-30 RX ADMIN — FINASTERIDE 5 MILLIGRAM(S): 5 TABLET, FILM COATED ORAL at 13:02

## 2022-12-30 NOTE — DISCHARGE NOTE PROVIDER - NSDCCPCAREPLAN_GEN_ALL_CORE_FT
PRINCIPAL DISCHARGE DIAGNOSIS  Diagnosis: Near syncope  Assessment and Plan of Treatment: you had multiple episodes of near syncope while driving. you were admitted to rule out acute coronary syndrome. due to lack of chest pain cardiology deferred acute intervention in the setting of ekg abnormalities. you had an echo which did not present any alarming findings. orthostatic vitals were negative. please follow up with your cardiologist. may need a holter monitor      SECONDARY DISCHARGE DIAGNOSES  Diagnosis: EKG abnormalities  Assessment and Plan of Treatment: 12/29: EKG showedModerate voltage criteria for LVH, may be normal variant. T wave abnormality, consider inferior ischemia  Due to lack of chest pain cardiology deferred any acute intervention.   follow up with cardiology outpatient

## 2022-12-30 NOTE — DISCHARGE NOTE PROVIDER - CARE PROVIDER_API CALL
Jhony Bush (DO)  Internal Medicine  [address] [place] [address]  Phone: [phone]  Fax: [phone]  Follow Up Time: 2 weeks    Jane Curtis)  Cardiovascular Disease; Internal Medicine  [address] [place] [address] [address]  [place] [address]  Phone: [phone]  Fax: [phone]  Follow Up Time: 2 weeks

## 2022-12-30 NOTE — ED ADULT NURSE REASSESSMENT NOTE - NS ED NURSE REASSESS COMMENT FT1
patient admitted to [facility] and moved to Ed4 bed 2.  Patient remains on cardiac monitor. Patient sleeping and offers no complaints.  Report given to BELLA Hernandez.

## 2022-12-30 NOTE — PHYSICAL THERAPY INITIAL EVALUATION ADULT - SHORT TERM MEMORY, REHAB EVAL
Pt here today for FUP post SBRT at [facility] for prostate cancer. He has a PSA of 0.3, Test 443, and an AUA score of 15/35 with c/o urinary frequency, hesitancy, urgency, dribbling, and nocturia. intact

## 2022-12-30 NOTE — DISCHARGE NOTE NURSING/CASE MANAGEMENT/SOCIAL WORK - PATIENT PORTAL LINK FT
You can access the FollowMyHealth Patient Portal offered by [facility] by registering at the following website: [email or web address]. By joining [employer]’s FollowMyHealth portal, you will also be able to view your health information using other applications (apps) compatible with our system. 2

## 2022-12-30 NOTE — DISCHARGE NOTE PROVIDER - ATTENDING DISCHARGE PHYSICAL EXAMINATION:
GENERAL: NAD, well-developed  NEURO: AO x3, PERRLA, EOMI, motor strength intact in 4/4 extremities, sensation intact  HEAD:  Atraumatic, Normocephalic  EYES: conjunctiva and sclera clear  NECK: Supple, No JVD, no lymphadenopathy, no thyromegaly  CHEST/LUNG: Clear to auscultation bilaterally; No wheezes, rales or rhonchi  HEART: Regular rate and rhythm; No murmurs, rubs, or gallops  ABDOMEN: Soft, Nontender, Nondistended; Bowel sounds present, no masses.  EXTREMITIES:  2+ Peripheral Pulses, No clubbing, cyanosis, or edema  SKIN: Warm, dry, intact, no rashes or lesions  PSYCH: affect appropriate

## 2022-12-30 NOTE — DISCHARGE NOTE PROVIDER - PROVIDER TOKENS
PROVIDER:[TOKEN:[[id number]],FOLLOWUP:[2 weeks]],PROVIDER:[TOKEN:[[id number]],FOLLOWUP:[2 weeks]]

## 2022-12-30 NOTE — PHYSICAL THERAPY INITIAL EVALUATION ADULT - REHAB POTENTIAL, PT EVAL
[Right] : right elbow [Pain with Extension] : pain with extension [] : no tenderness over olecranon [FreeTextEntry9] : Extension 10 deg [de-identified] : Pain with strength testing  [TWNoteComboBox7] : flexion 110 degrees no skilled PT needs

## 2022-12-30 NOTE — DISCHARGE NOTE PROVIDER - HOSPITAL COURSE
82M w/ h/o HTN, DLD, DM, and BPH p/w lightheadedness a/w near-syncope. Episode occurred earlier today while driving and last a few minutes. Subsequently pulled over and called EMS. Reports similar episode yesterday that also lasted a few minutes, but otherwise no similar prior h/o. Recently diagnosed w/ URI and started on cefdinir. No decreased PO intake reported. No reported fevers, chills, CP, palpitations, SOB, abdominal pain, n/v/d, dysuria, or LE swelling.     In ED, pt HD stable on vitals. No acute ischemic changes on initial EKG. Tn negative x2. Initially admitted to OBS unit. However, repeat EKG demonstrated findings for acute ischemic changes, so Code STEMI activated. Per cardiology, no acute intervention offered at this time. Admitted to telemetry for ACS r/o.    #ACS, r/o  #Hypertension  #Dyslipidemia  Initially p/w lightheadedness a/w near syncope. Tn neg x2. Initial EKG showed no acute ischemic changes, but repeat EKG demonstrated findings for acute ischemic changes, so Code STEMI activated. Given lack of chest pain at the time, no acute intervention offered per cardiology.  - c/w atorvastatin 40mg PO QHS  - c/w amlodipine 10mg PO QD  - c/w lisinopril 40mg PO QD (therapeutic interchange for quinapril)  - holding chlorthalidone (non-formulary)  - admit to telemetry  - repeat EKG in AM  - repeat trop in AM neg x3  - check lipid panel, A1c, and TSH  - TTE  - OP cardio f/u    #Diabetes Mellitus  Managed via metformin 500 bid  - hold metformin, may need procedure this admission  - Monitor FS TIDAC and QHS  - c/w moderate-dose ISS   - obtain A1c as above    #BPH  - c/w tamsulosin 0.4mg PO QHS  - c/w finasteride 5mg PO QD    DVT PPX:  GI PPX: none indicated  DIET: DASH/TLC + CC  ACTIVITY: IAT  CODE STATUS: Full Code  DISPOSITION: From Home    PENDING: troponin (negx2), ekg, echo, lipid, a1c, orthostatic, cardio consult   82M w/ h/o HTN, DLD, DM, and BPH p/w lightheadedness a/w near-syncope. Episode occurred earlier today while driving and last a few minutes. Subsequently pulled over and called EMS. Reports similar episode yesterday that also lasted a few minutes, but otherwise no similar prior h/o. Recently diagnosed w/ URI and started on cefdinir. No decreased PO intake reported. No reported fevers, chills, CP, palpitations, SOB, abdominal pain, n/v/d, dysuria, or LE swelling.     In ED, pt HD stable on vitals. No acute ischemic changes on initial EKG. Tn negative x2. Initially admitted to OBS unit. However, repeat EKG demonstrated findings for acute ischemic changes, so Code STEMI activated. Per cardiology, no acute intervention offered at this time. Admitted to telemetry for ACS r/o.    #ACS, r/o  #Hypertension  #Dyslipidemia  Initially p/w lightheadedness a/w near syncope. Tn neg x3. Initial EKG showed no acute ischemic changes, but repeat EKG demonstrated findings for acute ischemic changes, so Code STEMI activated. Given lack of chest pain at the time, no acute intervention offered per cardiology.  - c/w atorvastatin 40mg PO QHS  - c/w amlodipine 10mg PO QD  - c/w lisinopril 40mg PO QD (therapeutic interchange for quinapril)  - holding chlorthalidone (non-formulary)  - admit to telemetry  - repeat EKG in AM  - repeat trop in AM neg x3  - check lipid panel, A1c, and TSH  - TTE  - OP cardio f/u    #Diabetes Mellitus  Managed via metformin 500 bid  - hold metformin, may need procedure this admission  - Monitor FS TIDAC and QHS  - while inpt moderate-dose ISS   - obtain A1c as above    #BPH  - c/w tamsulosin 0.4mg PO QHS  - c/w finasteride 5mg PO QD     82M w/ h/o HTN, DLD, DM, and BPH p/w lightheadedness a/w near-syncope. Episode occurred earlier today while driving and last a few minutes. Subsequently pulled over and called EMS. Reports similar episode yesterday that also lasted a few minutes, but otherwise no similar prior h/o. Recently diagnosed w/ URI and started on cefdinir. No decreased PO intake reported. No reported fevers, chills, CP, palpitations, SOB, abdominal pain, n/v/d, dysuria, or LE swelling.     In ED, pt HD stable on vitals. No acute ischemic changes on initial EKG. Tn negative x2. Initially admitted to OBS unit. However, repeat EKG demonstrated findings for acute ischemic changes, so Code STEMI activated. Per cardiology, no acute intervention offered at this time. Admitted to telemetry for ACS r/o.    #ACS, r/o  #Hypertension  #Dyslipidemia  Initially p/w lightheadedness a/w near syncope. Tn neg x3. Initial EKG showed no acute ischemic changes, but repeat EKG demonstrated findings for acute ischemic changes, so Code STEMI activated. Given lack of chest pain at the time, no acute intervention offered per cardiology.  - c/w atorvastatin 40mg PO QHS  - c/w amlodipine 10mg PO QD  - c/w lisinopril 40mg PO QD (therapeutic interchange for quinapril)  - holding chlorthalidone (non-formulary)  - admit to telemetry  - repeat EKG Normal sinus rhythm. Left ventricular hypertrophy with QRS widening  - repeat trop in AM neg x3  - TTE: EF 63%, mild aortic, pulm, tricuspid regugitation. dilatation ascending aorta  - OP cardio f/u    #Diabetes Mellitus  Managed via metformin 500 bid  - hold metformin, may need procedure this admission  - Monitor FS TIDAC and QHS  - while inpt moderate-dose ISS     #BPH  - c/w tamsulosin 0.4mg PO QHS  - c/w finasteride 5mg PO QD

## 2022-12-30 NOTE — DISCHARGE NOTE PROVIDER - NSDCMRMEDTOKEN_GEN_ALL_CORE_FT
amLODIPine 10 mg oral tablet: 1 tab(s) orally once a day  atorvastatin 40 mg oral tablet: 1 tab(s) orally once a day (at bedtime)  chlorthalidone 25 mg oral tablet: 1 tab(s) orally once a day  citalopram 20 mg oral tablet: 1 tab(s) orally once a day  finasteride 5 mg oral tablet: 1 tab(s) orally once a day  metFORMIN 500 mg oral tablet: 1 tab(s) orally 2 times a day  quinapril 40 mg oral tablet: 1 tab(s) orally once a day  tamsulosin 0.4 mg oral capsule: 1 cap(s) orally once a day (at bedtime)   amLODIPine 10 mg oral tablet: 1 tab(s) orally once a day  atorvastatin 40 mg oral tablet: 1 tab(s) orally once a day (at bedtime)  chlorthalidone 25 mg oral tablet: 0.5 tab(s) orally once a day . take this dose until follow up with your doctor outpatient  citalopram 20 mg oral tablet: 1 tab(s) orally once a day  finasteride 5 mg oral tablet: 1 tab(s) orally once a day  metFORMIN 500 mg oral tablet: 1 tab(s) orally 2 times a day  quinapril 40 mg oral tablet: 1 tab(s) orally once a day  tamsulosin 0.4 mg oral capsule: 1 cap(s) orally once a day (at bedtime)

## 2022-12-30 NOTE — PHYSICAL THERAPY INITIAL EVALUATION ADULT - ADDITIONAL COMMENTS
Pt lives with wife and daughter in house with 8 ESTER, 1 FOS inside, ambulates independently, drives.

## 2022-12-30 NOTE — DISCHARGE NOTE NURSING/CASE MANAGEMENT/SOCIAL WORK - NSDCPEFALRISK_GEN_ALL_CORE
For information on Fall & Injury Prevention, visit: [email or web address] OR  [email or web address] OR  https://www.cdc.gov/steadi/patient.html

## 2023-01-06 DIAGNOSIS — Z20.822 CONTACT WITH AND (SUSPECTED) EXPOSURE TO COVID-19: ICD-10-CM

## 2023-01-06 DIAGNOSIS — R55 SYNCOPE AND COLLAPSE: ICD-10-CM

## 2023-01-06 DIAGNOSIS — E11.9 TYPE 2 DIABETES MELLITUS WITHOUT COMPLICATIONS: ICD-10-CM

## 2023-01-06 DIAGNOSIS — I10 ESSENTIAL (PRIMARY) HYPERTENSION: ICD-10-CM

## 2023-01-06 DIAGNOSIS — N40.0 BENIGN PROSTATIC HYPERPLASIA WITHOUT LOWER URINARY TRACT SYMPTOMS: ICD-10-CM

## 2023-01-06 DIAGNOSIS — E78.5 HYPERLIPIDEMIA, UNSPECIFIED: ICD-10-CM

## 2023-01-06 DIAGNOSIS — Z86.19 PERSONAL HISTORY OF OTHER INFECTIOUS AND PARASITIC DISEASES: ICD-10-CM

## 2023-01-06 DIAGNOSIS — E86.0 DEHYDRATION: ICD-10-CM

## 2023-01-06 DIAGNOSIS — Z87.891 PERSONAL HISTORY OF NICOTINE DEPENDENCE: ICD-10-CM

## 2023-01-06 DIAGNOSIS — Z79.84 LONG TERM (CURRENT) USE OF ORAL HYPOGLYCEMIC DRUGS: ICD-10-CM

## 2023-05-27 ENCOUNTER — OUTPATIENT (OUTPATIENT)
Dept: OUTPATIENT SERVICES | Facility: HOSPITAL | Age: 83
LOS: 1 days | End: 2023-05-27
Payer: MEDICARE

## 2023-05-27 DIAGNOSIS — M25.539 PAIN IN UNSPECIFIED WRIST: ICD-10-CM

## 2023-05-27 PROBLEM — E78.5 HYPERLIPIDEMIA, UNSPECIFIED: Chronic | Status: ACTIVE | Noted: 2022-12-30

## 2023-05-27 PROBLEM — E11.9 TYPE 2 DIABETES MELLITUS WITHOUT COMPLICATIONS: Chronic | Status: ACTIVE | Noted: 2022-12-30

## 2023-05-27 PROBLEM — I10 ESSENTIAL (PRIMARY) HYPERTENSION: Chronic | Status: ACTIVE | Noted: 2022-12-30

## 2023-05-27 PROBLEM — N40.0 BENIGN PROSTATIC HYPERPLASIA WITHOUT LOWER URINARY TRACT SYMPTOMS: Chronic | Status: ACTIVE | Noted: 2022-12-30

## 2023-05-27 PROCEDURE — 73110 X-RAY EXAM OF WRIST: CPT | Mod: 26,LT

## 2023-05-27 PROCEDURE — 73110 X-RAY EXAM OF WRIST: CPT | Mod: LT

## 2023-05-28 DIAGNOSIS — M25.539 PAIN IN UNSPECIFIED WRIST: ICD-10-CM

## 2023-07-10 ENCOUNTER — APPOINTMENT (OUTPATIENT)
Dept: ORTHOPEDIC SURGERY | Facility: CLINIC | Age: 83
End: 2023-07-10
Payer: MEDICARE

## 2023-07-10 VITALS — HEIGHT: 68 IN | WEIGHT: 175 LBS | BODY MASS INDEX: 26.52 KG/M2

## 2023-07-10 PROCEDURE — 99202 OFFICE O/P NEW SF 15 MIN: CPT | Mod: 25

## 2023-07-10 PROCEDURE — 20550 NJX 1 TENDON SHEATH/LIGAMENT: CPT

## 2023-07-10 NOTE — PHYSICAL EXAM
[de-identified] : Patient is tender to palpation of the first dorsal compartment patient is a positive Finkelstein's test there is no erythema ecchymosis or abrasions.

## 2023-07-10 NOTE — DATA REVIEWED
[FreeTextEntry1] : Radiographs reviewed showing no fracture dislocation no destructive lesions.  3 views from outside facility

## 2023-07-10 NOTE — ASSESSMENT
[FreeTextEntry1] : Patient is left-sided dequervains tendinitis.  Given a lidocaine cortisone injection under sterile conditions tolerated the injection well.  Elevated blood sugar was discussed we will see us back in a month if necessary.

## 2023-07-10 NOTE — HISTORY OF PRESENT ILLNESS
[de-identified] : 82-year-old male with left-sided wrist pain discomfort comes in today for evaluation this been going on for a while has had no relief of symptoms he was having some numbness and tingling to fingers that has resolved.

## 2023-07-14 NOTE — ED CDU PROVIDER DISPOSITION NOTE - NS_CDULENGTHOFSTAY_ED_A_ED
----- Message from MIK Dasilva sent at 7/14/2023  7:19 AM CDT -----  Electrolytes, kidney function, liver function stable. Slightly anemic. Her heart failure lab is up some, we will continue to monitor as needed. No changes in medication at this time.   
I called and left a message on voicemail, giving patient the results below. Patient was encouraged to call back with questions or concerns.   
3 Hour(s) 55 Minute(s)

## 2023-08-14 ENCOUNTER — APPOINTMENT (OUTPATIENT)
Dept: ORTHOPEDIC SURGERY | Facility: CLINIC | Age: 83
End: 2023-08-14
Payer: MEDICARE

## 2023-08-14 VITALS — WEIGHT: 175 LBS | HEIGHT: 68 IN | BODY MASS INDEX: 26.52 KG/M2

## 2023-08-14 DIAGNOSIS — E06.1 SUBACUTE THYROIDITIS: ICD-10-CM

## 2023-08-14 PROCEDURE — 99213 OFFICE O/P EST LOW 20 MIN: CPT | Mod: 25

## 2023-08-14 PROCEDURE — 20550 NJX 1 TENDON SHEATH/LIGAMENT: CPT

## 2023-08-14 NOTE — ASSESSMENT
[FreeTextEntry1] : Patient has left-sided dequervains tendinitis patient received a second cortisone injection today.  If the injection does not help we will consider surgical intervention for dequervains release.

## 2023-08-14 NOTE — PHYSICAL EXAM
[de-identified] : Patient is tenderness to palpation along the first dorsal compartment.  Positive Finkelstein's test is noted normal sensation normal capillary fill.  No erythema ecchymoses or abrasions

## 2023-08-14 NOTE — HISTORY OF PRESENT ILLNESS
[de-identified] : 82-year-old male with left-sided dequervains tendinitis.  Cortisone injection at his last visit.  It did help his symptoms a little bit.  But not significantly and he comes in for the evaluation today.

## 2023-08-14 NOTE — PROCEDURE
[FreeTextEntry3] : de Quervains injection was performed because of pain inflammation and stiffness Anesthesia: ethyl chloride sprayed topically Dexamethsone:  1 cc of 4mg/ml Lidocaine: An injection of Lidocaine 1% 1cc  Patient has tried OTC's including aspirin, Ibuprofen, Aleve etc or prescription NSAIDS, and/or exercises at home and/ or physical therapy without satisfactory response. After verbal consent using sterile preparation and technique. The risks, benefits, and alternatives to cortisone injection were explained in full to the patient. Risks outlined include but are not limited to infection, sepsis, bleeding, scarring, skin discoloration, temporary increase in pain, syncopal episode, failure to resolve symptoms, allergic reaction, symptom recurrence, and elevation of blood sugar in diabetics. Patient understood the risks. All questions were answered. After discussion of options, patient requested an injection. Oral informed consent was obtained and sterile prep was done of the injection site. Sterile technique was utilized for the procedure including the preparation of the solutions used for the injection. Patient tolerated the procedure well. Advised to ice the injection site this evening. Prep with Etoh locally to site. Sterile technique used tendon sheath of first compartment was injected  Left wrist first compartment

## 2023-09-20 NOTE — ED PROVIDER NOTE - ATTENDING APP SHARED VISIT CONTRIBUTION OF CARE
C3 nurse attempted to contact Jaymie Wilhelm for a TCC post hospital discharge follow up call. No answer. The patient does not have a scheduled HOSFU appointment, and the pt does not have an [facility] PCP.         See MDM

## 2024-02-21 ENCOUNTER — OUTPATIENT (OUTPATIENT)
Dept: OUTPATIENT SERVICES | Facility: HOSPITAL | Age: 84
LOS: 1 days | End: 2024-02-21
Payer: MEDICARE

## 2024-02-21 DIAGNOSIS — R41.3 OTHER AMNESIA: ICD-10-CM

## 2024-02-21 DIAGNOSIS — Z00.8 ENCOUNTER FOR OTHER GENERAL EXAMINATION: ICD-10-CM

## 2024-02-21 PROCEDURE — 70551 MRI BRAIN STEM W/O DYE: CPT

## 2024-02-21 PROCEDURE — 70551 MRI BRAIN STEM W/O DYE: CPT | Mod: 26

## 2024-02-22 DIAGNOSIS — R41.3 OTHER AMNESIA: ICD-10-CM

## 2024-10-21 ENCOUNTER — OUTPATIENT (OUTPATIENT)
Dept: OUTPATIENT SERVICES | Facility: HOSPITAL | Age: 84
LOS: 1 days | End: 2024-10-21
Payer: MEDICARE

## 2024-10-21 DIAGNOSIS — Z00.8 ENCOUNTER FOR OTHER GENERAL EXAMINATION: ICD-10-CM

## 2024-10-21 DIAGNOSIS — I25.10 ATHEROSCLEROTIC HEART DISEASE OF NATIVE CORONARY ARTERY WITHOUT ANGINA PECTORIS: ICD-10-CM

## 2024-10-21 PROCEDURE — 75574 CT ANGIO HRT W/3D IMAGE: CPT | Mod: 26

## 2024-10-21 PROCEDURE — 75574 CT ANGIO HRT W/3D IMAGE: CPT

## 2024-10-22 DIAGNOSIS — I25.10 ATHEROSCLEROTIC HEART DISEASE OF NATIVE CORONARY ARTERY WITHOUT ANGINA PECTORIS: ICD-10-CM

## 2024-10-28 ENCOUNTER — OUTPATIENT (OUTPATIENT)
Dept: OUTPATIENT SERVICES | Facility: HOSPITAL | Age: 84
LOS: 1 days | End: 2024-10-28
Payer: MEDICARE

## 2024-10-28 DIAGNOSIS — I25.10 ATHEROSCLEROTIC HEART DISEASE OF NATIVE CORONARY ARTERY WITHOUT ANGINA PECTORIS: ICD-10-CM

## 2024-10-28 PROCEDURE — 75580 N-INVAS EST C FFR SW ALY CTA: CPT | Mod: 26

## 2024-10-28 PROCEDURE — 75580 N-INVAS EST C FFR SW ALY CTA: CPT

## 2024-10-29 DIAGNOSIS — I25.10 ATHEROSCLEROTIC HEART DISEASE OF NATIVE CORONARY ARTERY WITHOUT ANGINA PECTORIS: ICD-10-CM

## 2024-11-20 ENCOUNTER — NON-APPOINTMENT (OUTPATIENT)
Age: 84
End: 2024-11-20

## 2024-11-25 ENCOUNTER — OUTPATIENT (OUTPATIENT)
Dept: OUTPATIENT SERVICES | Facility: HOSPITAL | Age: 84
LOS: 1 days | End: 2024-11-25
Payer: MEDICARE

## 2024-11-25 DIAGNOSIS — R05.1 ACUTE COUGH: ICD-10-CM

## 2024-11-25 PROCEDURE — 71046 X-RAY EXAM CHEST 2 VIEWS: CPT

## 2024-11-25 PROCEDURE — 71046 X-RAY EXAM CHEST 2 VIEWS: CPT | Mod: 26

## 2024-11-26 DIAGNOSIS — R05.1 ACUTE COUGH: ICD-10-CM

## 2025-01-07 ENCOUNTER — OUTPATIENT (OUTPATIENT)
Dept: OUTPATIENT SERVICES | Facility: HOSPITAL | Age: 85
LOS: 1 days | End: 2025-01-07
Payer: MEDICARE

## 2025-01-07 DIAGNOSIS — R91.8 OTHER NONSPECIFIC ABNORMAL FINDING OF LUNG FIELD: ICD-10-CM

## 2025-01-07 PROCEDURE — 71046 X-RAY EXAM CHEST 2 VIEWS: CPT

## 2025-01-07 PROCEDURE — 71046 X-RAY EXAM CHEST 2 VIEWS: CPT | Mod: 26

## 2025-01-08 DIAGNOSIS — R91.8 OTHER NONSPECIFIC ABNORMAL FINDING OF LUNG FIELD: ICD-10-CM

## 2025-07-18 ENCOUNTER — OUTPATIENT (OUTPATIENT)
Dept: OUTPATIENT SERVICES | Facility: HOSPITAL | Age: 85
LOS: 1 days | End: 2025-07-18
Payer: MEDICARE

## 2025-07-18 DIAGNOSIS — M54.59 OTHER LOW BACK PAIN: ICD-10-CM

## 2025-07-18 PROCEDURE — 72110 X-RAY EXAM L-2 SPINE 4/>VWS: CPT | Mod: 26

## 2025-07-18 PROCEDURE — 72110 X-RAY EXAM L-2 SPINE 4/>VWS: CPT

## 2025-07-19 DIAGNOSIS — M54.59 OTHER LOW BACK PAIN: ICD-10-CM
